# Patient Record
Sex: FEMALE | Race: WHITE | NOT HISPANIC OR LATINO | Employment: UNEMPLOYED | URBAN - METROPOLITAN AREA
[De-identification: names, ages, dates, MRNs, and addresses within clinical notes are randomized per-mention and may not be internally consistent; named-entity substitution may affect disease eponyms.]

---

## 2019-01-10 ENCOUNTER — HOSPITAL ENCOUNTER (OUTPATIENT)
Facility: MEDICAL CENTER | Age: 39
End: 2019-01-10
Attending: OBSTETRICS & GYNECOLOGY | Admitting: OBSTETRICS & GYNECOLOGY
Payer: MEDICAID

## 2019-01-18 ENCOUNTER — INITIAL PRENATAL (OUTPATIENT)
Dept: OBGYN | Facility: CLINIC | Age: 39
End: 2019-01-18
Payer: MEDICAID

## 2019-01-18 VITALS
WEIGHT: 163 LBS | BODY MASS INDEX: 30.78 KG/M2 | SYSTOLIC BLOOD PRESSURE: 118 MMHG | HEIGHT: 61 IN | DIASTOLIC BLOOD PRESSURE: 74 MMHG

## 2019-01-18 DIAGNOSIS — O02.0 BLIGHTED OVUM: ICD-10-CM

## 2019-01-18 DIAGNOSIS — F41.9 ANXIETY: ICD-10-CM

## 2019-01-18 PROCEDURE — 99203 OFFICE O/P NEW LOW 30 MIN: CPT | Mod: 25 | Performed by: OBSTETRICS & GYNECOLOGY

## 2019-01-18 PROCEDURE — 76817 TRANSVAGINAL US OBSTETRIC: CPT | Performed by: OBSTETRICS & GYNECOLOGY

## 2019-01-18 RX ORDER — ALPRAZOLAM 0.5 MG/1
0.5 TABLET ORAL NIGHTLY PRN
Qty: 3 TAB | Refills: 0 | Status: SHIPPED | OUTPATIENT
Start: 2019-01-18 | End: 2019-01-21

## 2019-01-18 NOTE — PROGRESS NOTES
Pt. Here for  visit today.  # [phone]  First prenatal care  Pt. States having tender breasts, heartburn, and constipation.   Pharmacy verified    Chaperone offered and accepted.

## 2019-01-18 NOTE — PROGRESS NOTES
"Jelly Escalona,  38 y.o.  female presents today with a C/O of :oligomenorrhea. Pt   Patient's last menstrual period was 10/29/2018 (exact date).       Subjective : Nausea/Vomiting: No:  Abdominal /pelvic cramping : No :   vaginal bleeding:No      Menstrual Flow : moderate   GYN ROS:  normal menses, no abnormal bleeding, pelvic pain or discharge, no breast pain or new or enlarging lumps on self exam      Past Medical History:   Diagnosis Date   • Anxiety    • Depression        History reviewed. No pertinent surgical history.    Current Birth control:  none    OB History    Para Term  AB Living   5       4     SAB TAB Ectopic Molar Multiple Live Births   1 3              # Outcome Date GA Lbr Roni/2nd Weight Sex Delivery Anes PTL Lv   5 Current            4 SAB  6w0d    SAB      3 TAB  5w0d    TAB      2 TAB  5w0d    TAB      1 TAB  5w0d    TAB                 Allergy:      Ciprofloxacin; Crab; Latex; Methergine [methylergonovine]; and Pineapple    Exam;    /74   Ht 1.549 m (5' 1\")   Wt 73.9 kg (163 lb)   LMP 10/29/2018 (Exact Date)   BMI 30.80 kg/m²   well-appearing, well-hydrated, well-nourished, alert, pleasant and verbal  normal;   PERRLA, EOMI, fundi grossly normal, no papilledema, no AV nicking, sclera clear  Clear to auscultation  RRR No M  abdomen is soft without significant tenderness, masses, organomegaly or guarding  deferredLab.    No results found for this or any previous visit (from the past 336 hour(s)).  Ultrasound :     Per my Read   Transvaginal   First trimester findings: Intrauterine gestational sac seen: yes  Gestational sac summary: empty sac  Fetal cardiac activity: absent  Crown-rump length: no fetal pole   Uterus 8 week size , no adnexal masses  Cul de sac : no fluid     Assessment:    Blighted ovum   Spent 30 minutes minutes with the patient ; Face to Face, with >50% of this time spent in counseling and coordination of care, surrounding the above " "mentioned issues as well as: discuss options , etiology , and follow up for this problem .   PAtient elects D&C, as she \"doesnt want pregnancy symptoms , if no baby \"       Plan:  Suction D&c   1/23/2019   Pre Med with Motrin 800mg   Xanax 0.5 mg  30\" before procedure       "

## 2019-01-29 ENCOUNTER — TELEPHONE (OUTPATIENT)
Dept: OBGYN | Facility: CLINIC | Age: 39
End: 2019-01-29

## 2019-01-29 NOTE — TELEPHONE ENCOUNTER
Called pt per Dr. Weber to follow up on her cancellation of scheduled D&C 1/23/19.  Per pt, started bleeding very heavy Monday 1/21/19. She spoke to a Nurse at [facility] in [facility] who advised pt to be seen. Pt was seen at [facility] that evening when an emergency D&C was performed there. Pt cancelled her appt with us and has been following up with them. Pt states she has an appointment 2/11/19, I asked pt to please sign a release for [facility] to send her OP records to us so we can scan in her chart here. Gave pt a good fax number and pt agreed to plan. No further questions.